# Patient Record
Sex: MALE | Race: WHITE | NOT HISPANIC OR LATINO | Employment: UNEMPLOYED | URBAN - METROPOLITAN AREA
[De-identification: names, ages, dates, MRNs, and addresses within clinical notes are randomized per-mention and may not be internally consistent; named-entity substitution may affect disease eponyms.]

---
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
## 2020-06-17 ENCOUNTER — OFFICE VISIT (OUTPATIENT)
Dept: URGENT CARE | Facility: CLINIC | Age: 20
End: 2020-06-17
Payer: COMMERCIAL

## 2020-06-17 VITALS
WEIGHT: 235 LBS | SYSTOLIC BLOOD PRESSURE: 141 MMHG | RESPIRATION RATE: 17 BRPM | DIASTOLIC BLOOD PRESSURE: 95 MMHG | HEIGHT: 76 IN | HEART RATE: 63 BPM | TEMPERATURE: 98 F | OXYGEN SATURATION: 98 % | BODY MASS INDEX: 28.62 KG/M2

## 2020-06-17 DIAGNOSIS — Z20.822 EXPOSURE TO COVID-19 VIRUS: ICD-10-CM

## 2020-06-17 PROCEDURE — U0003 INFECTIOUS AGENT DETECTION BY NUCLEIC ACID (DNA OR RNA); SEVERE ACUTE RESPIRATORY SYNDROME CORONAVIRUS 2 (SARS-COV-2) (CORONAVIRUS DISEASE [COVID-19]), AMPLIFIED PROBE TECHNIQUE, MAKING USE OF HIGH THROUGHPUT TECHNOLOGIES AS DESCRIBED BY CMS-2020-01-R: HCPCS

## 2020-06-17 PROCEDURE — 99212 OFFICE O/P EST SF 10 MIN: CPT | Mod: S$GLB,,, | Performed by: NURSE PRACTITIONER

## 2020-06-17 PROCEDURE — 99212 PR OFFICE/OUTPT VISIT, EST, LEVL II, 10-19 MIN: ICD-10-PCS | Mod: S$GLB,,, | Performed by: NURSE PRACTITIONER

## 2020-06-17 NOTE — PROGRESS NOTES
"Subjective:       Patient ID: Janusz Delgado is a 20 y.o. male.    Vitals:  height is 6' 3.5" (1.918 m) and weight is 106.6 kg (235 lb). His tympanic temperature is 98.4 °F (36.9 °C). His blood pressure is 162/78 (abnormal) and his pulse is 63. His respiration is 17 and oxygen saturation is 98%.     Chief Complaint: COVID-19 Concerns    Pt is here for COVID swab.  Asymptomatic.  Exposed this weekend. Pt denies SOB, chest pain, dizziness, heart palpitations, nausea and vomiting. Limited contact with possible covid-19 per [facility] emergency protocol. Covid-19 teaching complete.       Other  Pertinent negatives include no abdominal pain, anorexia, arthralgias, change in bowel habit, chest pain, chills, congestion, coughing, diaphoresis, fatigue, fever, headaches, joint swelling, myalgias, nausea, neck pain, numbness, rash, sore throat, swollen glands, urinary symptoms, vertigo, visual change, vomiting or weakness.       Constitution: Negative for chills, sweating, fatigue and fever.   HENT: Negative for congestion and sore throat.    Neck: Negative for neck pain and painful lymph nodes.   Cardiovascular: Negative for chest pain and leg swelling.   Eyes: Negative for double vision and blurred vision.   Respiratory: Negative for cough and shortness of breath.    Gastrointestinal: Negative for abdominal pain, nausea, vomiting and diarrhea.   Genitourinary: Negative for dysuria, frequency and urgency.   Musculoskeletal: Negative for joint pain, joint swelling, muscle cramps and muscle ache.   Skin: Negative for color change, pale and rash.   Allergic/Immunologic: Negative for seasonal allergies.   Neurological: Negative for dizziness, history of vertigo, light-headedness, passing out, headaches and numbness.   Hematologic/Lymphatic: Negative for swollen lymph nodes, easy bruising/bleeding and history of blood clots. Does not bruise/bleed easily.   Psychiatric/Behavioral: Negative for nervous/anxious, sleep disturbance " and depression. The patient is not nervous/anxious.        Objective:      Physical Exam   Constitutional: He is oriented to person, place, and time. He appears well-developed. He is cooperative.  Non-toxic appearance. He does not appear ill. No distress.   HENT:   Head: Normocephalic and atraumatic.   Right Ear: Hearing, tympanic membrane, external ear and ear canal normal.   Left Ear: Hearing, tympanic membrane, external ear and ear canal normal.   Nose: Nose normal. No mucosal edema, rhinorrhea or nasal deformity. No epistaxis. Right sinus exhibits no maxillary sinus tenderness and no frontal sinus tenderness. Left sinus exhibits no maxillary sinus tenderness and no frontal sinus tenderness.   Mouth/Throat: Uvula is midline, oropharynx is clear and moist and mucous membranes are normal. No trismus in the jaw. Normal dentition. No uvula swelling. No posterior oropharyngeal erythema.   Eyes: Conjunctivae and lids are normal. Right eye exhibits no discharge. Left eye exhibits no discharge. No scleral icterus.   Neck: Trachea normal, normal range of motion, full passive range of motion without pain and phonation normal. Neck supple.   Cardiovascular: Normal rate, regular rhythm, normal heart sounds and normal pulses.   Pulmonary/Chest: Effort normal and breath sounds normal. No respiratory distress.   Abdominal: Soft. Normal appearance and bowel sounds are normal. He exhibits no distension, no pulsatile midline mass and no mass. There is no abdominal tenderness.   Musculoskeletal: Normal range of motion.         General: No deformity.   Neurological: He is alert and oriented to person, place, and time. He exhibits normal muscle tone. Coordination normal.   Skin: Skin is warm, dry, intact, not diaphoretic and not pale.   Psychiatric: His speech is normal and behavior is normal. Judgment and thought content normal.   Nursing note and vitals reviewed.        Assessment:       1. Exposure to Covid-19 Virus        Plan:          Exposure to Covid-19 Virus  -     COVID-19 Routine Screening

## 2020-06-17 NOTE — PATIENT INSTRUCTIONS
Instructions for Patients Awaiting COVID-19 Test Results    Test results should be available within 7 days.    You can contact your care team via the [facility] patient portal or the regular phone number to check on testing status.    Your results will be made available to your provider and to our Infection Control Team. As soon as results are available, we will contact you. [facility] will not be releasing results to the [facility] portal until your provider reviews them.     Please continue infection control precautions like covering your mouth when coughing, washing hands frequently and minimizing contact with others whenever possible. We recommend that you stay home while you are awaiting test results, if possible. Consider wearing a mask if you need to go out in public, until result is known.     Please call [facility] anywhere care if need to be seen again.     Urgent Care Management:  - Treatment plan discussed.  - PCP recommendations given.  - Return precautions advised.  - Patient agrees with and understands plan of care.    Patient Instructions, Education, Teaching and Summary of Visit:      RETURN TO CLINIC IF SYMPTOMS WORSEN OR CALL 911 IMMEDIATELY FOR SHORTNESS OF BREATH, CHEST PAIN, DIZZINESS, WORSENING PAIN, NAUSEA AND VOMITING, HEART PALPITATIONS, FEVER AND/OR NECK STIFFNESS. FOLLOW UP WITH PRIMARY CARE PROVIDER IN THE AM.    -Diagnosis and treatment plan discussed with patient.  -Patient agreed with my treatment plan.  -Patient will follow up with primary care provider or Specialty Provider, as discussed.     -If you were prescribed a narcotic or controlled medication, do not drive or operate heavy equipment or machinery while taking these medications.  -You must understand that you've received an Urgent Care treatment only and that you may be released before all your medical problems are known or treated.   -You, the patient, will arrange for follow up care as instructed.  -Follow up with your PCP or  specialty clinic as directed in the next 1-2 weeks if not improved or as needed.    -You can call [phone] to schedule an appointment with the appropriate provider.  -If your condition worsens we recommend that you receive another evaluation at the emergency room immediately or contact your primary medical clinics after hours call service to discuss your concerns.  -Please return here or go to the Emergency Department for any concerns or worsening of condition.    Please arrange follow up with your primary medical clinic as soon as possible. You must understand that you've received an Urgent Care treatment only and that you may be released before all of your medical problems are known or treated. You, the patient, will arrange for follow up as instructed. If your symptoms worsen or fail to improve you should go to the Emergency Room.  WE CANNOT RULE OUT ALL POSSIBLE CAUSES OF YOUR SYMPTOMS IN THE URGENT CARE SETTING PLEASE GO TO THE ER IF YOU FEELS YOUR CONDITION IS WORSENING OR YOU WOULD LIKE EMERGENT EVALUATION.     Please return here or go to the Emergency Department for any concerns or worsening of condition.  If you were prescribed antibiotics, please take them to completion.  If you were prescribed a narcotic medication, do not drive or operate heavy equipment or machinery while taking these medications.  Please follow up with your primary care doctor or specialist as needed.     If you  smoke, please stop smoking.

## 2020-06-18 LAB — SARS-COV-2 RNA RESP QL NAA+PROBE: NOT DETECTED

## 2020-06-19 ENCOUNTER — TELEPHONE (OUTPATIENT)
Dept: URGENT CARE | Facility: CLINIC | Age: 20
End: 2020-06-19

## 2021-07-21 ENCOUNTER — OFFICE VISIT (OUTPATIENT)
Dept: INTERNAL MEDICINE | Facility: CLINIC | Age: 21
End: 2021-07-21
Payer: COMMERCIAL

## 2021-07-21 DIAGNOSIS — H10.022 OTHER MUCOPURULENT CONJUNCTIVITIS OF LEFT EYE: Primary | ICD-10-CM

## 2021-07-21 PROCEDURE — 99213 PR OFFICE/OUTPT VISIT, EST, LEVL III, 20-29 MIN: ICD-10-PCS | Mod: 95,,, | Performed by: NURSE PRACTITIONER

## 2021-07-21 PROCEDURE — 99213 OFFICE O/P EST LOW 20 MIN: CPT | Mod: 95,,, | Performed by: NURSE PRACTITIONER

## 2021-07-21 RX ORDER — GENTAMICIN SULFATE 3 MG/ML
1 SOLUTION/ DROPS OPHTHALMIC 3 TIMES DAILY
Qty: 5 ML | Refills: 0 | Status: SHIPPED | OUTPATIENT
Start: 2021-07-21

## 2023-07-21 NOTE — PROGRESS NOTES
HISTORY OF PRESENT ILLNESS:   Pt is here today for post-operative followup of his ankle ORIF.  he is doing well.  We have reviewed his findings and discussed plan of care and future treatment options.                            He notes that he has been compliant with non weight bearing     DATE OF PROCEDURE: 10/12/2018  PROCEDURES PERFORMED:   Right  1. ankle medial malleolar fracture, open reduction and internal fixation (CPT 23518)  2. Drainage ankle hematoma (CPT 82272)  3. aplication of short leg splint                                                         PHYSICAL EXAMINATION:     Incision sites healed well  No evidence of any erythema, infection or induration  No effusion  2+ DP pulse  No swelling, no calf tenderness  - Bella's sign    X-RAYs ordered and reviewed   Hardware in appropriate position with routine healing                                                                                ASSESSMENT:                                                                                                                                               1. Status post above, doing well.                                                                                                                               PLAN:                                                                                                                                                     1. Cast removed today. Begin partial weightbearing x 4 weeks the WBAT in boot for another 2 weeks after that. Out of boot in 6 weeks from now.   Begin PT next week.   No driving for now.  2. Emphasized quad function.  3. I have discussed return to activity in detail.  4. he will see us back in 4 weeks with new x-rays.                                      5. All questions were answered and he should contact us if he  has any questions or concerns in the interim.                                                          WEIGHTBEARING STATUS: Nonweightbearing  PICC team at bedside.       Jessica Anthony RN  04/02/21 9475 Session ID: [id number]  Language: Albanian   ID: #[id number]   Name: Leobardo Duval for 8 weeks.  PWB x 4.

## 2023-10-20 ENCOUNTER — HOSPITAL ENCOUNTER (EMERGENCY)
Facility: HOSPITAL | Age: 23
Discharge: HOME | End: 2023-10-20
Attending: EMERGENCY MEDICINE
Payer: COMMERCIAL

## 2023-10-20 VITALS
TEMPERATURE: 98 F | RESPIRATION RATE: 20 BRPM | OXYGEN SATURATION: 97 % | DIASTOLIC BLOOD PRESSURE: 57 MMHG | SYSTOLIC BLOOD PRESSURE: 134 MMHG | HEART RATE: 74 BPM
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
## 2023-10-20 DIAGNOSIS — S09.90XA INJURY OF HEAD, INITIAL ENCOUNTER: Primary | ICD-10-CM

## 2023-10-20 LAB — POCT GLUCOSE: 108 MG/DL (ref 70–110)

## 2023-10-20 PROCEDURE — 93005 ELECTROCARDIOGRAM TRACING: CPT

## 2023-10-20 PROCEDURE — 93010 EKG 12-LEAD: ICD-10-PCS | Mod: ,,, | Performed by: INTERNAL MEDICINE

## 2023-10-20 PROCEDURE — 99285 EMERGENCY DEPT VISIT HI MDM: CPT | Mod: 25

## 2023-10-20 PROCEDURE — 93010 ELECTROCARDIOGRAM REPORT: CPT | Mod: ,,, | Performed by: INTERNAL MEDICINE

## 2023-10-20 PROCEDURE — 82962 GLUCOSE BLOOD TEST: CPT

## 2023-10-20 NOTE — ED NOTES
The patient's father presented to receive the father & transport home. Discharge papers provided to father. All belongings given to father.

## 2023-10-20 NOTE — ED NOTES
"Pt screaming and swearing at staff. Ripping off monitoring equipment. Pt states "I need to piss." Pt able to stand at bedside with urinal. Security and Mds to bedside.PT continues to scream and swear at staff.  "

## 2023-10-20 NOTE — ED PROVIDER NOTES
Encounter Date: 10/20/2023       History     Chief Complaint   Patient presents with    Fall     Pt fell off barstool while drinking at bar. Pt responsive to pain. Per friends pt fell face first off the barstool. +ETOH. Lac to left eyebrow and dry blood around nose. Pt arrives in C-collar.      23-year-old male, with a contributory medical history, presents to the ED for fall.  Patient has no verbal response, only responsive to painful stimulation. Brought to the ED by EMS for a fall at a bar.  Per EMS reports, patient fell off barstool while drinking at bar. Per friends patient fell face first off the barstool. +ETOH.  Patient arrives on C-collar.  Per family report, patient is generally healthy, no history of syncope.        Review of patient's allergies indicates:   Allergen Reactions    Nuts [tree nut] Anaphylaxis    Peanut Anaphylaxis    Penicillins Hives    Wasp venom Swelling     No past medical history on file.  Past Surgical History:   Procedure Laterality Date    COSMETIC SURGERY      front tooth caps as a child    OPEN REDUCTION AND INTERNAL FIXATION (ORIF) OF INJURY OF ANKLE Right 10/12/2018    Procedure: ORIF, ANKLE;  Surgeon: Elise Leslie MD;  Location: [facility];  Service: Orthopedics;  Laterality: Right;     Family History   Problem Relation Age of Onset    No Known Problems Mother     No Known Problems Father     No Known Problems Sister      Social History     Tobacco Use    Smoking status: Never    Smokeless tobacco: Never   Substance Use Topics    Alcohol use: No     Review of Systems    Physical Exam     Initial Vitals [10/20/23 0052]   BP Pulse Resp Temp SpO2   110/60 72 (!) 26 98 °F (36.7 °C) 98 %      MAP       --         Physical Exam    Nursing note and vitals reviewed.  Constitutional: He appears well-developed. He appears listless. No distress.   HENT:   Head: Normocephalic.   Nose: Nose normal.   1 cm skin abrasion above left eyebrow, no underlying hematoma, or crepitus or step-off.  Scalp  is atraumatic.  No hematotympanum.  Dried heme in nares but septal hematoma   Eyes: Conjunctivae and EOM are normal. Pupils are equal, round, and reactive to light.   Neck: No JVD present.   On C-collar   Cardiovascular:  Normal rate, regular rhythm and normal heart sounds.           Pulmonary/Chest: Breath sounds normal. No respiratory distress.   Abdominal: Abdomen is soft. He exhibits no distension. There is no abdominal tenderness.   Musculoskeletal:         General: No edema.      Comments: Moving all 4 extremities to painful stimuli.  No obvious deformity or ecchymosis.  Back exam without step-offs, or ecchymosis.  Pelvis is stable     Neurological: He appears listless. GCS eye subscore is 2. GCS verbal subscore is 1. GCS motor subscore is 5.   Skin: Skin is warm and dry. Capillary refill takes less than 2 seconds.         ED Course   Procedures  Labs Reviewed   HIV 1 / 2 ANTIBODY   HEPATITIS C ANTIBODY   POCT GLUCOSE MONITORING CONTINUOUS          Imaging Results              CT Cervical Spine Without Contrast (Final result)  Result time 10/20/23 03:15:07      Final result by Chauncey Long MD (10/20/23 03:15:07)                   Impression:      No acute displaced maxillofacial fracture noting limited assessment of the mandible due to motion degradation.    No acute fracture of the cervical spine identified.      Electronically signed by: Chauncey Long MD  Date:    10/20/2023  Time:    03:15               Narrative:    EXAMINATION:  CT MAXILLOFACIAL WITHOUT CONTRAST; CT CERVICAL SPINE WITHOUT CONTRAST    CLINICAL HISTORY:  Facial trauma, blunt;; Neck trauma, intoxicated or obtunded (Age >= 16y);    TECHNIQUE:  Low dose axial images, sagittal and coronal reformations were obtained through the face and cervical spine in a single acquisition.  Contrast was not administered.    COMPARISON:  None    FINDINGS:    Normal alignment. No prevertebral soft tissue thickening. The craniocervical junction, the  dens, cervical vertebra and cervical intervertebral disc spaces appear adequately maintained.Soft tissue scalp hematoma over the left frontal calvarium.      Assessment of the mandible limited due to some motion artifact.  No acute displaced maxillofacial fracture.The bony orbits and zygomatic arches appear intact.Paranasal sinuses are aerated and clear.  Orbital contents appear unremarkable.                                       CT Maxillofacial Without Contrast (Final result)  Result time 10/20/23 03:15:07      Final result by Chauncey Lnog MD (10/20/23 03:15:07)                   Impression:      No acute displaced maxillofacial fracture noting limited assessment of the mandible due to motion degradation.    No acute fracture of the cervical spine identified.      Electronically signed by: Chauncey Long MD  Date:    10/20/2023  Time:    03:15               Narrative:    EXAMINATION:  CT MAXILLOFACIAL WITHOUT CONTRAST; CT CERVICAL SPINE WITHOUT CONTRAST    CLINICAL HISTORY:  Facial trauma, blunt;; Neck trauma, intoxicated or obtunded (Age >= 16y);    TECHNIQUE:  Low dose axial images, sagittal and coronal reformations were obtained through the face and cervical spine in a single acquisition.  Contrast was not administered.    COMPARISON:  None    FINDINGS:    Normal alignment. No prevertebral soft tissue thickening. The craniocervical junction, the dens, cervical vertebra and cervical intervertebral disc spaces appear adequately maintained.Soft tissue scalp hematoma over the left frontal calvarium.      Assessment of the mandible limited due to some motion artifact.  No acute displaced maxillofacial fracture.The bony orbits and zygomatic arches appear intact.Paranasal sinuses are aerated and clear.  Orbital contents appear unremarkable.                                       CT Head Without Contrast (Final result)  Result time 10/20/23 03:09:18      Final result by Chauncey Long MD (10/20/23 03:09:18)                    Impression:      No acute intracranial abnormalities.    Soft tissue scalp hematoma over the left frontal calvarium.      Electronically signed by: Chauncey Long MD  Date:    10/20/2023  Time:    03:09               Narrative:    EXAMINATION:  CT HEAD WITHOUT CONTRAST    CLINICAL HISTORY:  Head trauma, abnormal mental status (Age 19-64y);    TECHNIQUE:  Low dose axial images were obtained through the head.  Coronal and sagittal reformations were also performed. Contrast was not administered.    COMPARISON:  None.    FINDINGS:  The brain parenchyma appears normal for age with good corticomedullary differentiation.  There is no evidence of acute infarct, hemorrhage, or mass.  The ventricular system is normal in size.  No mass-effect or midline shift.  There are no abnormal extra-axial fluid collections.  The paranasal sinuses and mastoid air cells are clear.  The calvarium appears intact. Soft tissue scalp hematoma over the left frontal calvarium..                                       Medications   Tdap (BOOSTRIX) vaccine injection 0.5 mL (has no administration in time range)     Medical Decision Making  Amount and/or Complexity of Data Reviewed  Radiology: ordered. Decision-making details documented in ED Course.    Risk  Prescription drug management.              Attending Attestation:   Physician Attestation Statement for Resident:  As the supervising MD   Physician Attestation Statement: I have personally seen and examined this patient.   I agree with the above history.  -:   As the supervising MD I agree with the above PE.     As the supervising MD I agree with the above treatment, course, plan, and disposition.   -: 23-year-old male who presents with a mechanical fall, was intoxicated and fell off of a bar stool.  On primary exam, ABCs intact.  He does localize to pain.  Exam consistent with intoxication.  He does have evidence of a head injury with a hematoma and small laceration to the  forehead.  The laceration is well approximated and less than 1 cm so I do not think he needs any stitches.  C-collar in place.  Unable to do full neuro exam but grossly intact and moving all extremities.  No other obvious findings on exam.  CT head, max/face, and neck ordered.  We will need to sober up in order to get a good neurologic exam and makes her there no other acute findings.  If negative neuro exam is reassuring and can clear collar, we will be safe for discharge.  Tetanus updated.    The patient is waking up.  He was aggressive towards staff but we can talk him down.  He is trying to leave.  He is now awake, oriented x3.  I was able to do a full neuro exam and he has:  5/5 strength in bilateral upper extremity shoulder abduction and adduction, elbow flexion and extension, wrist flexion and extension, , pincer, thumb extension, finger abduction and adduction, and bilateral lower extremity dorsiflexion and plantar flexion, knee flexion and extension, hip flexion and extension.  Normal sensation throughout all upper and lower extremity dermatomes.  He is no midline tenderness of the C-spine.  No spinal tenderness.  No chest or abdominal tenderness.  He is no pain with neck range of motion.  He will be discharged in the care of his father who we discussed all of this with.    Patient ambulated steadily out with his father                         ED Course as of 10/20/23 0439   Fri Oct 20, 2023   0343 CT Head Without Contrast  Independent interpretation, no intracranial hemorrhage, skull fracture. [NC]   0343 CT Maxillofacial Without Contrast  Independent interpretation, no acute fracture   [NC]   0343 CT Cervical Spine Without Contrast  Independent interpretation, cervical spine fracture or other emergency process [NC]      ED Course User Index  [NC] Caleb Kolb MD                    Clinical Impression:   Final diagnoses:  [W19.XXXA] Fall  [S09.90XA] Injury of head, initial encounter (Primary)        ED  Disposition Condition    Discharge Stable          ED Prescriptions    None       Follow-up Information    None          Lilly Saravia MD  10/20/23 0547       Lilly Saravia MD  10/20/23 0552

## 2023-10-20 NOTE — ED NOTES
"The patient is demanding his pants so that he can "go home, I'm leaving." Dr. Saravia  @ bedside providing the POC to include sleeping/resting until patient ethan up & then will be re-evaluated for discharge. The patient is using profanities referring to staff as " bitch." Patient pointing his finger @ staff & instructing staff, " don't you touch me, don't touch my clothes." Threatens to fight staff outside of the hospital. Dr. Saravia to call patient's father to receive him. Monitored by nurse, EDT's & hospital security.  "

## 2023-10-20 NOTE — ED TRIAGE NOTES
"22 yo M presents to the ED via EMS s/p fall off barstool. Per EMS, pt has been "drinking a lot." Pt responsive to painful stimuli only. Pupils equal and reactive, 5mm. Resp even & unlabored. No PMH per family. VSS.  "

## 2023-10-20 NOTE — DISCHARGE INSTRUCTIONS
We would CT scans of your head, neck, and facial bones.  There was no acute findings.  You could have a mild concussion.  Symptoms include headache, dizziness, mild confusion or difficulty with typical tasks, nausea.  Symptoms usually last 1-2 weeks.  If they last longer than this, please see your primary doctor for follow-up.

## 2023-10-20 NOTE — ED NOTES
Remains belligerent, oppositional w/ staff & father. Patient refuses VS as he does not want staff to touch him or his clothes.

## 2023-10-20 NOTE — ED NOTES
Pt in CT, states get the fuck away from me. Security called. Need for CT explained, pt agreed to proceed.

## 2025-03-27 NOTE — TELEPHONE ENCOUNTER
Spoke to pt appointment made.   The patient has been examined and the H&P has been reviewed:    I concur with the findings and no changes have occurred since H&P was written.    Surgery risks, benefits and alternative options discussed and understood by patient/family.          Active Hospital Problems    Diagnosis  POA    *Acute medial meniscus tear, left, initial encounter [S86.242A]  Yes      Resolved Hospital Problems   No resolved problems to display.

## (undated) DEVICE — DRAPE PLASTIC U 60X72

## (undated) DEVICE — UNDERGLOVE BIOGEL PI SZ 6.5 LF

## (undated) DEVICE — SEE MEDLINE ITEM 146231

## (undated) DEVICE — DRESSING XEROFORM FOIL PK 1X8

## (undated) DEVICE — TOURNIQUET SB QC DP 34X4IN

## (undated) DEVICE — Device

## (undated) DEVICE — SEE MEDLINE ITEM 153151

## (undated) DEVICE — BUCKET PLASTER DISPOSABLE

## (undated) DEVICE — SUT VICRYL PLUS 0 CT1 18IN

## (undated) DEVICE — IMPLANTABLE DEVICE
Type: IMPLANTABLE DEVICE | Site: ANKLE | Status: NON-FUNCTIONAL
Removed: 2018-10-12

## (undated) DEVICE — APPLICATOR CHLORAPREP ORN 26ML

## (undated) DEVICE — DRAPE EXTREMITY FULL FABRIC

## (undated) DEVICE — PLASTER SPLINT FAST 5INX30IN

## (undated) DEVICE — SEE MEDLINE ITEM 152523

## (undated) DEVICE — SUT ETHILON 3-0 FS-1 30

## (undated) DEVICE — SOL 9P NACL IRR PIC IL

## (undated) DEVICE — GLOVE BIOGEL SKINSENSE PI 6.5

## (undated) DEVICE — STOCKINETTE TUBULAR 2PL 6 X 4

## (undated) DEVICE — SEE MEDLINE ITEM 157150

## (undated) DEVICE — SUT VICRYL PLUS 3-0 SH 18IN

## (undated) DEVICE — GLOVE SURGEON SYN PF SZ 9

## (undated) DEVICE — BLADE SURG STAINLESS STEEL #15

## (undated) DEVICE — DRAPE C ARM 42 X 120 10/BX

## (undated) DEVICE — GOWN SMART IMP BREATHABLE XXLG

## (undated) DEVICE — SPONGE LAP 4X18 PREWASHED

## (undated) DEVICE — GLOVE BIOGEL SKINSENSE PI 7.0

## (undated) DEVICE — BIT DRILL 2.7.

## (undated) DEVICE — UNDERGLOVES BIOGEL PI SIZE 7.5

## (undated) DEVICE — GAUZE SPONGE 4X4 12PLY

## (undated) DEVICE — ELECTRODE REM PLYHSV RETURN 9

## (undated) DEVICE — PAD CAST SPECIALIST STRL 6